# Patient Record
Sex: MALE | Race: WHITE | Employment: OTHER | URBAN - NONMETROPOLITAN AREA
[De-identification: names, ages, dates, MRNs, and addresses within clinical notes are randomized per-mention and may not be internally consistent; named-entity substitution may affect disease eponyms.]

---

## 2020-01-21 ENCOUNTER — ANESTHESIA (OUTPATIENT)
Dept: OPERATING ROOM | Age: 75
End: 2020-01-21
Payer: MEDICARE

## 2020-01-21 ENCOUNTER — ANESTHESIA EVENT (OUTPATIENT)
Dept: OPERATING ROOM | Age: 75
End: 2020-01-21
Payer: MEDICARE

## 2020-01-21 ENCOUNTER — HOSPITAL ENCOUNTER (OUTPATIENT)
Age: 75
Discharge: HOME | End: 2020-01-22
Attending: SURGERY | Admitting: SURGERY
Payer: MEDICARE

## 2020-01-21 ENCOUNTER — APPOINTMENT (OUTPATIENT)
Dept: GENERAL RADIOLOGY | Age: 75
End: 2020-01-21
Payer: MEDICARE

## 2020-01-21 VITALS — SYSTOLIC BLOOD PRESSURE: 136 MMHG | OXYGEN SATURATION: 97 % | DIASTOLIC BLOOD PRESSURE: 71 MMHG

## 2020-01-21 PROBLEM — K22.2 ESOPHAGEAL OBSTRUCTION DUE TO FOOD IMPACTION: Status: ACTIVE | Noted: 2020-01-21

## 2020-01-21 PROBLEM — T18.128A ESOPHAGEAL OBSTRUCTION DUE TO FOOD IMPACTION: Status: ACTIVE | Noted: 2020-01-21

## 2020-01-21 LAB
ANION GAP SERPL CALCULATED.3IONS-SCNC: 11 MMOL/L (ref 9–17)
BUN BLDV-MCNC: 16 MG/DL (ref 8–23)
BUN/CREAT BLD: 14 (ref 9–20)
CALCIUM SERPL-MCNC: 9.4 MG/DL (ref 8.6–10.4)
CHLORIDE BLD-SCNC: 100 MMOL/L (ref 98–107)
CO2: 25 MMOL/L (ref 20–31)
CREAT SERPL-MCNC: 1.17 MG/DL (ref 0.7–1.2)
GFR AFRICAN AMERICAN: >60 ML/MIN
GFR NON-AFRICAN AMERICAN: >60 ML/MIN
GFR SERPL CREATININE-BSD FRML MDRD: ABNORMAL ML/MIN/{1.73_M2}
GFR SERPL CREATININE-BSD FRML MDRD: ABNORMAL ML/MIN/{1.73_M2}
GLUCOSE BLD-MCNC: 127 MG/DL (ref 70–99)
HCT VFR BLD CALC: 32.9 % (ref 40.7–50.3)
HEMOGLOBIN: 10.1 G/DL (ref 13–17)
MCH RBC QN AUTO: 27.4 PG (ref 25.2–33.5)
MCHC RBC AUTO-ENTMCNC: 30.7 G/DL (ref 28.4–34.8)
MCV RBC AUTO: 89.4 FL (ref 82.6–102.9)
NRBC AUTOMATED: 0 PER 100 WBC
PDW BLD-RTO: 13.1 % (ref 11.8–14.4)
PLATELET # BLD: 233 K/UL (ref 138–453)
PMV BLD AUTO: 9.6 FL (ref 8.1–13.5)
POTASSIUM SERPL-SCNC: 4.4 MMOL/L (ref 3.7–5.3)
RBC # BLD: 3.68 M/UL (ref 4.21–5.77)
SODIUM BLD-SCNC: 136 MMOL/L (ref 135–144)
TROPONIN INTERP: NORMAL
TROPONIN T: <0.03 NG/ML
TROPONIN, HIGH SENSITIVITY: NORMAL NG/L (ref 0–22)
WBC # BLD: 9.1 K/UL (ref 3.5–11.3)

## 2020-01-21 PROCEDURE — 3609012400 HC EGD TRANSORAL BIOPSY SINGLE/MULTIPLE: Performed by: SURGERY

## 2020-01-21 PROCEDURE — 2500000003 HC RX 250 WO HCPCS: Performed by: PHYSICIAN ASSISTANT

## 2020-01-21 PROCEDURE — 3700000001 HC ADD 15 MINUTES (ANESTHESIA): Performed by: SURGERY

## 2020-01-21 PROCEDURE — 80048 BASIC METABOLIC PNL TOTAL CA: CPT

## 2020-01-21 PROCEDURE — 6370000000 HC RX 637 (ALT 250 FOR IP): Performed by: SURGERY

## 2020-01-21 PROCEDURE — 2709999900 HC NON-CHARGEABLE SUPPLY: Performed by: SURGERY

## 2020-01-21 PROCEDURE — 99285 EMERGENCY DEPT VISIT HI MDM: CPT

## 2020-01-21 PROCEDURE — 96374 THER/PROPH/DIAG INJ IV PUSH: CPT

## 2020-01-21 PROCEDURE — 84484 ASSAY OF TROPONIN QUANT: CPT

## 2020-01-21 PROCEDURE — 71045 X-RAY EXAM CHEST 1 VIEW: CPT

## 2020-01-21 PROCEDURE — 7100000011 HC PHASE II RECOVERY - ADDTL 15 MIN: Performed by: SURGERY

## 2020-01-21 PROCEDURE — 2580000003 HC RX 258: Performed by: SURGERY

## 2020-01-21 PROCEDURE — 88342 IMHCHEM/IMCYTCHM 1ST ANTB: CPT

## 2020-01-21 PROCEDURE — 88341 IMHCHEM/IMCYTCHM EA ADD ANTB: CPT

## 2020-01-21 PROCEDURE — 99218 PR INITIAL OBSERVATION CARE/DAY 30 MINUTES: CPT | Performed by: SURGERY

## 2020-01-21 PROCEDURE — 2500000003 HC RX 250 WO HCPCS: Performed by: NURSE ANESTHETIST, CERTIFIED REGISTERED

## 2020-01-21 PROCEDURE — 3700000000 HC ANESTHESIA ATTENDED CARE: Performed by: SURGERY

## 2020-01-21 PROCEDURE — 7100000010 HC PHASE II RECOVERY - FIRST 15 MIN: Performed by: SURGERY

## 2020-01-21 PROCEDURE — 93005 ELECTROCARDIOGRAM TRACING: CPT | Performed by: EMERGENCY MEDICINE

## 2020-01-21 PROCEDURE — 6360000002 HC RX W HCPCS: Performed by: NURSE ANESTHETIST, CERTIFIED REGISTERED

## 2020-01-21 PROCEDURE — G0378 HOSPITAL OBSERVATION PER HR: HCPCS

## 2020-01-21 PROCEDURE — 6370000000 HC RX 637 (ALT 250 FOR IP): Performed by: PHYSICIAN ASSISTANT

## 2020-01-21 PROCEDURE — 85027 COMPLETE CBC AUTOMATED: CPT

## 2020-01-21 PROCEDURE — 88305 TISSUE EXAM BY PATHOLOGIST: CPT

## 2020-01-21 PROCEDURE — 6360000002 HC RX W HCPCS: Performed by: SURGERY

## 2020-01-21 RX ORDER — OMEPRAZOLE 20 MG/1
20 CAPSULE, DELAYED RELEASE ORAL DAILY
Status: DISCONTINUED | OUTPATIENT
Start: 2020-01-22 | End: 2020-01-22 | Stop reason: HOSPADM

## 2020-01-21 RX ORDER — SUCRALFATE ORAL 1 G/10ML
1 SUSPENSION ORAL 4 TIMES DAILY
Qty: 420 ML | Refills: 1 | Status: SHIPPED | OUTPATIENT
Start: 2020-01-21 | End: 2020-01-23

## 2020-01-21 RX ORDER — LOSARTAN POTASSIUM 50 MG/1
100 TABLET ORAL DAILY
Status: DISCONTINUED | OUTPATIENT
Start: 2020-01-22 | End: 2020-01-22 | Stop reason: HOSPADM

## 2020-01-21 RX ORDER — TAMSULOSIN HYDROCHLORIDE 0.4 MG/1
0.4 CAPSULE ORAL DAILY
Status: DISCONTINUED | OUTPATIENT
Start: 2020-01-22 | End: 2020-01-22 | Stop reason: HOSPADM

## 2020-01-21 RX ORDER — METFORMIN HYDROCHLORIDE 500 MG/1
500 TABLET, EXTENDED RELEASE ORAL
Status: DISCONTINUED | OUTPATIENT
Start: 2020-01-22 | End: 2020-01-22 | Stop reason: HOSPADM

## 2020-01-21 RX ORDER — GABAPENTIN 300 MG/1
300 CAPSULE ORAL 3 TIMES DAILY
Status: DISCONTINUED | OUTPATIENT
Start: 2020-01-21 | End: 2020-01-22 | Stop reason: HOSPADM

## 2020-01-21 RX ORDER — SODIUM CHLORIDE, SODIUM LACTATE, POTASSIUM CHLORIDE, CALCIUM CHLORIDE 600; 310; 30; 20 MG/100ML; MG/100ML; MG/100ML; MG/100ML
INJECTION, SOLUTION INTRAVENOUS CONTINUOUS
Status: DISCONTINUED | OUTPATIENT
Start: 2020-01-21 | End: 2020-01-22 | Stop reason: HOSPADM

## 2020-01-21 RX ORDER — LIDOCAINE HYDROCHLORIDE 20 MG/ML
INJECTION, SOLUTION EPIDURAL; INFILTRATION; INTRACAUDAL; PERINEURAL PRN
Status: DISCONTINUED | OUTPATIENT
Start: 2020-01-21 | End: 2020-01-21 | Stop reason: SDUPTHER

## 2020-01-21 RX ORDER — METFORMIN HYDROCHLORIDE 500 MG/1
500 TABLET, EXTENDED RELEASE ORAL
COMMUNITY

## 2020-01-21 RX ORDER — ASPIRIN 81 MG/1
81 TABLET ORAL DAILY
Status: DISCONTINUED | OUTPATIENT
Start: 2020-01-22 | End: 2020-01-22 | Stop reason: HOSPADM

## 2020-01-21 RX ORDER — SODIUM CHLORIDE 0.9 % (FLUSH) 0.9 %
10 SYRINGE (ML) INJECTION PRN
Status: DISCONTINUED | OUTPATIENT
Start: 2020-01-21 | End: 2020-01-22 | Stop reason: HOSPADM

## 2020-01-21 RX ORDER — FINASTERIDE 5 MG/1
5 TABLET, FILM COATED ORAL DAILY
Status: DISCONTINUED | OUTPATIENT
Start: 2020-01-22 | End: 2020-01-22 | Stop reason: HOSPADM

## 2020-01-21 RX ORDER — SODIUM CHLORIDE 0.9 % (FLUSH) 0.9 %
10 SYRINGE (ML) INJECTION EVERY 12 HOURS SCHEDULED
Status: DISCONTINUED | OUTPATIENT
Start: 2020-01-21 | End: 2020-01-22 | Stop reason: HOSPADM

## 2020-01-21 RX ORDER — PROPOFOL 10 MG/ML
INJECTION, EMULSION INTRAVENOUS PRN
Status: DISCONTINUED | OUTPATIENT
Start: 2020-01-21 | End: 2020-01-21 | Stop reason: SDUPTHER

## 2020-01-21 RX ORDER — ONDANSETRON 2 MG/ML
4 INJECTION INTRAMUSCULAR; INTRAVENOUS EVERY 6 HOURS PRN
Status: DISCONTINUED | OUTPATIENT
Start: 2020-01-21 | End: 2020-01-22 | Stop reason: HOSPADM

## 2020-01-21 RX ORDER — GABAPENTIN 300 MG/1
300 CAPSULE ORAL 3 TIMES DAILY
COMMUNITY

## 2020-01-21 RX ORDER — CARVEDILOL 12.5 MG/1
12.5 TABLET ORAL 2 TIMES DAILY
Status: DISCONTINUED | OUTPATIENT
Start: 2020-01-21 | End: 2020-01-22 | Stop reason: HOSPADM

## 2020-01-21 RX ORDER — SUCRALFATE 1 G/1
1 TABLET ORAL EVERY 6 HOURS SCHEDULED
Status: DISCONTINUED | OUTPATIENT
Start: 2020-01-22 | End: 2020-01-22 | Stop reason: HOSPADM

## 2020-01-21 RX ADMIN — CARVEDILOL 12.5 MG: 12.5 TABLET, FILM COATED ORAL at 23:48

## 2020-01-21 RX ADMIN — PROPOFOL 20 MG: 10 INJECTION, EMULSION INTRAVENOUS at 22:18

## 2020-01-21 RX ADMIN — PROPOFOL 30 MG: 10 INJECTION, EMULSION INTRAVENOUS at 22:27

## 2020-01-21 RX ADMIN — ONDANSETRON 4 MG: 2 INJECTION INTRAMUSCULAR; INTRAVENOUS at 23:48

## 2020-01-21 RX ADMIN — LIDOCAINE HYDROCHLORIDE 200 MG: 20 INJECTION, SOLUTION EPIDURAL; INFILTRATION; INTRACAUDAL; PERINEURAL at 22:15

## 2020-01-21 RX ADMIN — GABAPENTIN 300 MG: 300 CAPSULE ORAL at 23:48

## 2020-01-21 RX ADMIN — PROPOFOL 20 MG: 10 INJECTION, EMULSION INTRAVENOUS at 22:20

## 2020-01-21 RX ADMIN — NITROGLYCERIN 0.5 INCH: 20 OINTMENT TOPICAL at 19:18

## 2020-01-21 RX ADMIN — SUCRALFATE 1 G: 1 TABLET ORAL at 23:49

## 2020-01-21 RX ADMIN — GLUCAGON HYDROCHLORIDE 1 MG: KIT at 19:17

## 2020-01-21 RX ADMIN — GLUCAGON HYDROCHLORIDE 1 MG: KIT at 22:09

## 2020-01-21 RX ADMIN — PROPOFOL 80 MG: 10 INJECTION, EMULSION INTRAVENOUS at 22:15

## 2020-01-21 RX ADMIN — PROPOFOL 30 MG: 10 INJECTION, EMULSION INTRAVENOUS at 22:23

## 2020-01-21 RX ADMIN — SODIUM CHLORIDE, POTASSIUM CHLORIDE, SODIUM LACTATE AND CALCIUM CHLORIDE: 600; 310; 30; 20 INJECTION, SOLUTION INTRAVENOUS at 23:49

## 2020-01-21 ASSESSMENT — PULMONARY FUNCTION TESTS
PIF_VALUE: 1
PIF_VALUE: 0
PIF_VALUE: 1
PIF_VALUE: 0
PIF_VALUE: 1

## 2020-01-21 ASSESSMENT — ENCOUNTER SYMPTOMS
DYSPNEA ACTIVITY LEVEL: AFTER AMBULATING 2 FLIGHTS OF STAIRS
BLOOD IN STOOL: 0
TROUBLE SWALLOWING: 0
CHEST TIGHTNESS: 0
VOMITING: 0
EYE REDNESS: 0
ABDOMINAL PAIN: 0
CONSTIPATION: 0
WHEEZING: 0
NAUSEA: 0
RHINORRHEA: 0
DIARRHEA: 0
CHOKING: 1
SHORTNESS OF BREATH: 0
BACK PAIN: 0
EYE DISCHARGE: 0
SORE THROAT: 0
COUGH: 0

## 2020-01-21 ASSESSMENT — LIFESTYLE VARIABLES: SMOKING_STATUS: 0

## 2020-01-21 ASSESSMENT — PAIN SCALES - GENERAL: PAINLEVEL_OUTOF10: 0

## 2020-01-22 ENCOUNTER — APPOINTMENT (OUTPATIENT)
Dept: CT IMAGING | Age: 75
End: 2020-01-22
Payer: MEDICARE

## 2020-01-22 VITALS
OXYGEN SATURATION: 99 % | DIASTOLIC BLOOD PRESSURE: 60 MMHG | BODY MASS INDEX: 30.8 KG/M2 | HEART RATE: 76 BPM | TEMPERATURE: 98.4 F | RESPIRATION RATE: 18 BRPM | HEIGHT: 71 IN | WEIGHT: 220 LBS | SYSTOLIC BLOOD PRESSURE: 137 MMHG

## 2020-01-22 PROBLEM — Z98.890 S/P ENDOSCOPY: Status: ACTIVE | Noted: 2020-01-22

## 2020-01-22 LAB
EKG ATRIAL RATE: 64 BPM
EKG P AXIS: 97 DEGREES
EKG P-R INTERVAL: 198 MS
EKG Q-T INTERVAL: 404 MS
EKG QRS DURATION: 100 MS
EKG QTC CALCULATION (BAZETT): 416 MS
EKG R AXIS: 53 DEGREES
EKG T AXIS: 25 DEGREES
EKG VENTRICULAR RATE: 64 BPM

## 2020-01-22 PROCEDURE — 6370000000 HC RX 637 (ALT 250 FOR IP): Performed by: SURGERY

## 2020-01-22 PROCEDURE — 6360000004 HC RX CONTRAST MEDICATION: Performed by: SURGERY

## 2020-01-22 PROCEDURE — 71260 CT THORAX DX C+: CPT

## 2020-01-22 PROCEDURE — G0378 HOSPITAL OBSERVATION PER HR: HCPCS

## 2020-01-22 PROCEDURE — 93010 ELECTROCARDIOGRAM REPORT: CPT | Performed by: INTERNAL MEDICINE

## 2020-01-22 RX ADMIN — GABAPENTIN 300 MG: 300 CAPSULE ORAL at 09:22

## 2020-01-22 RX ADMIN — SUCRALFATE 1 G: 1 TABLET ORAL at 07:07

## 2020-01-22 RX ADMIN — TAMSULOSIN HYDROCHLORIDE 0.4 MG: 0.4 CAPSULE ORAL at 09:22

## 2020-01-22 RX ADMIN — LOSARTAN POTASSIUM 100 MG: 50 TABLET, FILM COATED ORAL at 09:21

## 2020-01-22 RX ADMIN — SUCRALFATE 1 G: 1 TABLET ORAL at 14:00

## 2020-01-22 RX ADMIN — ASPIRIN 81 MG: 81 TABLET, COATED ORAL at 09:22

## 2020-01-22 RX ADMIN — FINASTERIDE 5 MG: 5 TABLET, FILM COATED ORAL at 09:21

## 2020-01-22 RX ADMIN — IOPAMIDOL 75 ML: 755 INJECTION, SOLUTION INTRAVENOUS at 12:34

## 2020-01-22 RX ADMIN — GABAPENTIN 300 MG: 300 CAPSULE ORAL at 14:03

## 2020-01-22 RX ADMIN — CARVEDILOL 12.5 MG: 12.5 TABLET, FILM COATED ORAL at 09:22

## 2020-01-22 ASSESSMENT — PAIN SCALES - GENERAL: PAINLEVEL_OUTOF10: 0

## 2020-01-22 NOTE — PROGRESS NOTES
CT scan results abdomen and pelvis called to Merline Soman. Dr kerns will see patient when he finishes in surgery.

## 2020-01-22 NOTE — ED PROVIDER NOTES
Allergic/Immunologic: Negative for food allergies and immunocompromised state. Neurological: Negative for dizziness, syncope, weakness and light-headedness. Hematological: Negative for adenopathy. Does not bruise/bleed easily. Psychiatric/Behavioral: Negative for behavioral problems and suicidal ideas. The patient is not nervous/anxious. Except as noted above the remainder of the review of systems was reviewed and negative. PAST MEDICAL HISTORY     Past Medical History:   Diagnosis Date    Aortic aneurysm ([address] 2019    CAD (coronary artery disease)     GERD (gastroesophageal reflux disease)     Gout     Hyperlipidemia     Hypertension     Pre-diabetes          SURGICALHISTORY       Past Surgical History:   Procedure Laterality Date   [facility] SURGERY  1982, 1983, 2013    Herniated disc    CORONARY ANGIOPLASTY WITH STENT PLACEMENT  [id number]    x 1    PENILE PROSTHESIS  2003, 2008    PENILE PROSTHESIS  3/16/16    Replacement - Dr. Sania Fox  01/14/2013 Dr Angeles Villasenor    Removal of and Placement Penile Prosthesis    TURP  2003    TURP  12/03/12         CURRENT MEDICATIONS       Previous Medications    ASPIRIN 81 MG TABLET    Take 81 mg by mouth daily    CARVEDILOL (COREG) 12.5 MG TABLET    Take 12.5 mg by mouth 2 times daily Take am of surgery    Take 1 tab in am and 2 tabs at bedtime    CEPHALEXIN (KEFLEX) 500 MG CAPSULE    Take 1 capsule by mouth every 8 hours    FINASTERIDE (PROSCAR) 5 MG TABLET    Take 5 mg by mouth daily. GABAPENTIN (NEURONTIN) 300 MG CAPSULE    Take 300 mg by mouth 3 times daily. LOSARTAN (COZAAR) 100 MG TABLET    Take 100 mg by mouth daily. Take am of sx    METFORMIN (GLUCOPHAGE-XR) 500 MG EXTENDED RELEASE TABLET    Take 500 mg by mouth daily (with breakfast)    METH-HYO-M BL-JUDY ACD-PH SAL (HYOPHEN) 81.6 MG TABS    Take 1 tablet by mouth 3 times daily as needed (dysuria)    MULTIPLE VITAMIN (MULTI-VITAMIN) TABS    Take  by mouth daily. OMEPRAZOLE (PRILOSEC) 20 MG CAPSULE    Take 20 mg by mouth daily. SIMVASTATIN (ZOCOR) 5 MG TABLET    Take 5 mg by mouth nightly. TAMSULOSIN (FLOMAX) 0.4 MG CAPSULE    Take 0.4 mg by mouth daily. VITAMIN D (CHOLECALCIFEROL) 1000 UNITS CAPS CAPSULE    Take 1,000 Units by mouth daily. 2 tabs       ALLERGIES     Patient has no known allergies.     FAMILY HISTORY       Family History   Problem Relation Age of Onset    Arthritis Mother     Cancer Father         liver          SOCIAL HISTORY       Social History     Socioeconomic History    Marital status:      Spouse name: None    Number of children: None    Years of education: None    Highest education level: None   Occupational History    None   Social Needs    Financial resource strain: None    Food insecurity:     Worry: None     Inability: None    Transportation needs:     Medical: None     Non-medical: None   Tobacco Use    Smoking status: Former Smoker     Packs/day: 1.50     Years: 50.00     Pack years: 75.00     Last attempt to quit: 2008     Years since quittin.9    Smokeless tobacco: Never Used   Substance and Sexual Activity    Alcohol use: Yes     Comment: VERY LITTLE    Drug use: No    Sexual activity: None   Lifestyle    Physical activity:     Days per week: None     Minutes per session: None    Stress: None   Relationships    Social connections:     Talks on phone: None     Gets together: None     Attends [employer] service: None     Active member of club or organization: None     Attends meetings of clubs or organizations: None     Relationship status: None    Intimate partner violence:     Fear of current or ex partner: None     Emotionally abused: None     Physically abused: None     Forced sexual activity: None   Other Topics Concern    None   Social History Narrative    None       SCREENINGS      @FLOW([id number])@      PHYSICAL EXAM    (up to 7 for level 4, 8 or more for level 5)     ED Triage Vitals BP Temp Temp Source Pulse Resp SpO2 Height Weight   01/21/20 1814 01/21/20 1813 01/21/20 1813 01/21/20 1813 01/21/20 1813 01/21/20 1813 01/21/20 1813 01/21/20 1813   (!) 149/60 97.3 °F (36.3 °C) Oral 70 18 95 % 5' 11\" (1.803 m) 220 lb (99.8 kg)       Physical Exam  Vitals signs and nursing note reviewed. Constitutional:       General: He is not in acute distress. Appearance: He is well-developed. He is not diaphoretic. HENT:      Head: Normocephalic and atraumatic. Right Ear: External ear normal.      Left Ear: External ear normal.      Nose: Nose normal.      Mouth/Throat:      Mouth: Mucous membranes are moist.      Pharynx: No oropharyngeal exudate. Comments: Uvula is midline. No oral pharyngeal swelling or edema  Eyes:      General: No scleral icterus. Right eye: No discharge. Left eye: No discharge. Conjunctiva/sclera: Conjunctivae normal.      Pupils: Pupils are equal, round, and reactive to light. Neck:      Musculoskeletal: Normal range of motion and neck supple. Thyroid: No thyromegaly. Trachea: No tracheal deviation. Cardiovascular:      Rate and Rhythm: Normal rate and regular rhythm. Heart sounds: No murmur. No friction rub. No gallop. Pulmonary:      Effort: Pulmonary effort is normal. No respiratory distress. Breath sounds: Normal breath sounds. No stridor. No wheezing or rhonchi. Abdominal:      General: Bowel sounds are normal. There is no distension. Palpations: Abdomen is soft. Tenderness: There is no guarding or rebound. Musculoskeletal: Normal range of motion. General: No tenderness or deformity. Lymphadenopathy:      Cervical: No cervical adenopathy. Skin:     General: Skin is warm and dry. Findings: No erythema or rash. Neurological:      Mental Status: He is alert and oriented to person, place, and time. Cranial Nerves: No cranial nerve deficit. Motor: No abnormal muscle tone. Deep Tendon Reflexes: Reflexes are normal and symmetric. Reflexes normal.   Psychiatric:         Behavior: Behavior normal.         DIAGNOSTIC RESULTS     EKG: All EKG's are interpreted by the Emergency Department Physician who either signs orCo-signs this chart in the absence of a cardiologist.      RADIOLOGY:   Non-plainfilm images such as CT, Ultrasound and MRI are read by the radiologist. Plain radiographic images are visualized and preliminarily interpreted by the emergency physician with the below findings:      Interpretationper the Radiologist below, if available at the time of this note:    XR CHEST PORTABLE   Final Result   Left lower lobe subsegmental atelectasis               ED BEDSIDE ULTRASOUND:   Performed by ED Physician - none    LABS:  Labs Reviewed   CBC - Abnormal; Notable for the following components:       Result Value    RBC 3.68 (*)     Hemoglobin 10.1 (*)     Hematocrit 32.9 (*)     All other components within normal limits   BASIC METABOLIC PANEL - Abnormal; Notable for the following components:    Glucose 127 (*)     All other components within normal limits   TROPONIN       All other labs were within normal range or not returned as of this dictation. EMERGENCY DEPARTMENT COURSE and DIFFERENTIAL DIAGNOSIS/MDM:   Vitals:    Vitals:    01/21/20 1813 01/21/20 1814   BP:  (!) 149/60   Pulse: 70    Resp: 18    Temp: 97.3 °F (36.3 °C)    TempSrc: Oral    SpO2: 95%    Weight: 220 lb (99.8 kg)    Height: 5' 11\" (1.803 m)          MDM  69-year-old male who presents because he has been unable to swallow anything all day. Reports that he last ate chicken last night and felt something in his throat. On exam he is slightly spitting things up. I am going to try pharmacological approach to alleviating the food bolus. However I discussed them that this might be a type of endoscopic procedure.     Patient denies any history of endoscopy and before in the past.  After pharmacologic attempt he is

## 2020-01-22 NOTE — ED NOTES
Patient given water, patient states when he drinks it still feels like something is stuck.      Jag Fischer RN  01/21/20 2046

## 2020-01-22 NOTE — H&P
Brock Soto is an 75 y. o.male. Allergies: No Known Allergies    Active Problems:    * No active hospital problems. *  Resolved Problems:    * No resolved hospital problems. *    Blood pressure (!) 145/81, pulse 71, temperature 96.8 °F (36 °C), temperature source Temporal, resp. rate 16, height 5' 11\" (1.803 m), weight 220 lb (99.8 kg), SpO2 100 %. HPI    Mr Kayy Germain is a pleasant 76 yo WM who had difficulty swallowing last night while eating chicken soup. It did not improve today. He presented through [place] [facility], unable to swallow liquids. Not improved with Glucagon. No coughing nor other signs of aspiration. Long history of GERD symptoms, well managed with PPI over the last several years. No recent weight changes. No abdominal pain. Colonoscopy about 8 years ago was normal without need for biopsy, per patient. No prior EGD. Quit tobacco several years ago after smoking 1-2 ppd. No excessive use of EtOH. No history of caustic ingestion. He lives alone, retired. Review of Systems   Constitutional: Negative for activity change, appetite change, chills, fever and unexpected weight change. HENT: Negative for nosebleeds, sneezing, sore throat and trouble swallowing. Eyes: Negative for visual disturbance. Respiratory: Positive for choking. Negative for cough and shortness of breath. Cardiovascular: Negative for chest pain, palpitations and leg swelling. Gastrointestinal: Negative for abdominal pain, blood in stool, nausea and vomiting. Genitourinary: Negative for dysuria, flank pain and hematuria. Musculoskeletal: Positive for arthralgias, neck pain and neck stiffness. Negative for back pain, gait problem and myalgias. Allergic/Immunologic: Negative for immunocompromised state. Neurological: Negative for dizziness, seizures, syncope, weakness and headaches. Hematological: Does not bruise/bleed easily. Psychiatric/Behavioral: Negative for confusion and sleep disturbance. Past Medical History:   Diagnosis Date    Aortic aneurysm ([facility] [address] 2019    CAD (coronary artery disease)     GERD (gastroesophageal reflux disease)     Gout     Hyperlipidemia     Hypertension     Pre-diabetes        Past Surgical History:   Procedure Laterality Date   [facility] SURGERY  , ,     Herniated disc    CORONARY ANGIOPLASTY WITH STENT PLACEMENT  9-2013    x 1    PENILE PROSTHESIS  ,     PENILE PROSTHESIS  3/16/16    Replacement - Dr. Rico Rizvi  2013 Dr Ever Raygoza    Removal of and Placement Penile Prosthesis    TURP      TURP  12       Family History   Problem Relation Age of Onset    Arthritis Mother     Cancer Father         liver       Allergies:  See list    Current Facility-Administered Medications   Medication Dose Route Frequency Provider Last Rate Last Dose    glucagon (rDNA) 1 MG injection                Social History     Socioeconomic History    Marital status:      Spouse name: None    Number of children: None    Years of education: None    Highest education level: None   Occupational History    None   Social Needs    Financial resource strain: None    Food insecurity:     Worry: None     Inability: None    Transportation needs:     Medical: None     Non-medical: None   Tobacco Use    Smoking status: Former Smoker     Packs/day: 1.50     Years: 50.00     Pack years: 75.00     Last attempt to quit: 2008     Years since quittin.9    Smokeless tobacco: Never Used   Substance and Sexual Activity    Alcohol use: Yes     Comment: VERY LITTLE    Drug use: No    Sexual activity: None   Lifestyle    Physical activity:     Days per week: None     Minutes per session: None    Stress: None   Relationships    Social connections:     Talks on phone: None     Gets together: None     Attends [employer] service: None     Active member of club or organization: None     Attends meetings of clubs or organizations: None     Relationship status: None    Intimate partner violence:     Fear of current or ex partner: None     Emotionally abused: None     Physically abused: None     Forced sexual activity: None   Other Topics Concern    None   Social History Narrative    None       Physical Exam  Vitals signs and nursing note reviewed. Constitutional:       General: He is not in acute distress. Appearance: He is well-developed. HENT:      Head: Normocephalic and atraumatic. Eyes:      General: No scleral icterus. Conjunctiva/sclera: Conjunctivae normal.      Pupils: Pupils are equal, round, and reactive to light. Neck:      Musculoskeletal: Normal range of motion and neck supple. Vascular: No JVD. Trachea: No tracheal deviation. Cardiovascular:      Rate and Rhythm: Normal rate and regular rhythm. Pulmonary:      Effort: Pulmonary effort is normal. No respiratory distress. Breath sounds: Normal breath sounds. Chest:      Chest wall: No tenderness. Abdominal:      General: Bowel sounds are normal. There is no distension. Palpations: Abdomen is soft. There is no mass. Tenderness: There is no tenderness. There is no guarding or rebound. Musculoskeletal: Normal range of motion. Lymphadenopathy:      Cervical: No cervical adenopathy. Skin:     General: Skin is warm and dry. Findings: No erythema or rash. Neurological:      Mental Status: He is alert and oriented to person, place, and time. Cranial Nerves: No cranial nerve deficit. Psychiatric:         Behavior: Behavior normal.         Thought Content:  Thought content normal.         Judgment: Judgment normal.          EGD [[id number]]    Resulted: 01/21/20 2133    Updated: 01/21/20 2133    Narrative:     No dictation    XR CHEST PORTABLE [[id number]]    Collected: 01/21/20 1919    Updated: 01/21/20 1933    Narrative:     EXAMINATION:  ONE XRAY VIEW OF THE CHEST    1/21/2020 7:17 pm    COMPARISON:  None. HISTORY:  ORDERING SYSTEM PROVIDED HISTORY: unable to swallow  TECHNOLOGIST PROVIDED HISTORY:  unable to swallow    FINDINGS:  Left lower lobe subsegmental atelectasis.  Right lung clear.  Heart and  mediastinum normal.  Bony thorax intact. Impression:     Left lower lobe subsegmental atelectasis   Troponin [[id number]]    Collected: 01/21/20 1845    Updated: 01/21/20 1927    Specimen Source: Blood     Troponin, High Sensitivity NOT REPORTED ng/L    Troponin T <0.03 ng/mL    Comment: Troponin T results cannot be compared to Troponin-I results. Troponin Interp         Comment: Reference Range:    <0.03     Within reference range.    0.03-0.09 Possible myocardial damage. Repeat at appropriate intervals to rule out chronic elevation.    >= 0.10   Indicative of myocardial damage.         Patients with high levels of Biotin oral intake (i.e >5mg/day) may have falsely decreased   Troponin T levels. Samples collected within 8 hours of biotin intake may require additional   information for diagnosis. Basic Metabolic Panel [[id number]] (Abnormal)    Collected: 01/21/20 1845    Updated: 01/21/20 1925    Specimen Source: Blood     Glucose 127High  mg/dL    BUN 16 mg/dL    CREATININE 1.17 mg/dL    Bun/Cre Ratio 14    Calcium 9.4 mg/dL    Sodium 136 mmol/L    Potassium 4.4 mmol/L    Chloride 100 mmol/L    CO2 25 mmol/L    Anion Gap 11 mmol/L    GFR Non-African American >60 mL/min    GFR African American >60 mL/min    GFR Comment         Comment: Average GFR for 79or more years old:    65 mL/min/1.73sq m   Chronic Kidney Disease:    <60 mL/min/1.73sq m   Kidney failure:    <15 mL/min/1.73sq m               eGFR calculated using average adult body mass.  Additional eGFR calculator available at:         [email or web address]              GFR Staging         Comment: Stage 1: Some kidney damage normal GFR   Stage 2: Mild kidney damage GFR 60-89   Stage 3: Moderate

## 2020-01-22 NOTE — BRIEF OP NOTE
Brief Postoperative Note  ______________________________________________________________    Patient: Be Corrales  YOB: 1945  MRN: [id number]  Date of Procedure: 1/21/2020    Pre-Op Diagnosis:      1. Dysphagia with esophageal food bolus      2. History GERD    Post-Op Diagnosis:      1. Esophageal food bolus     2. Distal esophageal mass     3. Small hiatal hernia       Procedure(s):      1. EGD     2. Clearance of esophageal food bolus     3. Biopsies GE junction mass    Anesthesia: General    Surgeon(s):  Guzman Steward MD    Assistant:      Estimated Blood Loss (mL): less than 82MF     Complications: None    Specimens:   ID Type Source Tests Collected by Time Destination   A :  Tissue GE Junction Biopsy SURGICAL PATHOLOGY Guzman Steward MD 1/21/2020 2229      Findings:   As above.     Dictated # [id number]    Guzman Steward MD  Date: 1/21/2020  Time: 10:34 PM

## 2020-01-22 NOTE — OP NOTE
[address], [place] [address]                                OPERATIVE REPORT    PATIENT NAME: Colton Petty                     :        1945  MED REC NO:   [id number]                              ROOM:       0315  ACCOUNT NO:   [de-identified]                           ADMIT DATE: 2020  PROVIDER:     Jacey Zheng    DATE OF PROCEDURE:  2020    ATTENDING SURGEON:  Dr. Jacey Zhneg. PRIMARY CARE PROVIDER:  Dr. Poonam Ballard. PREOPERATIVE DIAGNOSES:  1. Dysphagia with esophageal food bolus. 2.  History of gastroesophageal reflux disease. POSTOPERATIVE DIAGNOSES:  1. Esophageal food bolus. 2.  Distal esophageal mass. 3.  Small hiatal hernia. PROCEDURES PERFORMED:  1. Esophagogastroduodenoscopy. 2.  Clearance of esophageal food bolus. 3.  Biopsies of GE junction mass. ANESTHESIA:  General.    ESTIMATED BLOOD LOSS:  Less than 20 mL. INDICATIONS:  The patient is a pleasant 26-year-old white male with a  long history of gastroesophageal reflux disease well managed with  omeprazole, significant tobacco history with one to two packs per day  for most of his life, quit tobacco in , 75 pack years. He felt a  piece of food become lodged in his throat 24 hours ago when eating  dinner. Failed to clear with glucagon. At this time, EGD is indicated. DESCRIPTION OF PROCEDURE:  After obtaining informed consent with  discussion of risks, benefits and alternatives including a remote risk  of GI bleeding, perforation, missed lesions, aspiration, recurrence,  etc., the patient was taken to the operating theater and placed in the  left lateral recumbent position. Following adequate IV sedation, an  endoscope was passed over the tongue into the posterior pharynx. Vocal  folds were visualized and appeared to be normal.  The scope was directed  into the esophagus down to the GE junction.   Upper and mid esophagus  appeared normal.  In the lower esophagus at the GE junction, several  pieces of food were present. These were easily passed into the stomach  without difficulty. Small hiatal hernia was present. There was severe  esophagitis with the appearance of a distal esophageal mass at the level  of the GE junction. Biopsies of the mass were obtained. The stomach  was entered, had normal distensibility. On retroflexion, there did not  appear to be a gastric component to the mass. The fundus and cardia  appeared normal.  The body and prepyloric antrum had minimal gastritis  without ulceration. Pylorus was patent. The duodenum was entered. First, second, and third portions appeared normal.  The stomach was  decompressed by suction as the scope was removed. The patient tolerated  the procedure well and was transferred to PACU in stable condition. SPECIMENS:  Biopsies of GE junction mass. DRAINS:  None. COMPLICATIONS:  None. DISPOSITION:  To PACU awake, alert and stable. Following recovery, we  will admit the patient for extended recovery on the Surgical Floor. We  will request a CT scan of the chest, abdomen, and pelvis in the morning  with discharge thereafter on a liquid diet. Followup will be with me in  one week to review pathology.         Randell Walker    D: 01/21/2020 22:41:20       T: 01/21/2020 22:46:14     [email or web address]  Job#: [id number]     Doc#: [id number]    CC:  Vishal Pierce

## 2020-01-22 NOTE — PROGRESS NOTES
Social Work intial Assessment/Discharge Plan      Diagnosis:  Esophageal obstruction due to food impaction    Met with:  Patient      PCP:  Poonam Ballard MD    Payment Source:  [facility]    Advance Directives:  None    Code Status:  Full    Mental Status:  Alert and oriented. Very pleasant. Living Arrangement:  Home     Support Systems:  Marcial Webster, brother    Patient able to perform ADL's:  Independant    Current Services:  None    Current DME Equipment:  None    Able to get medication fill & pharmacy:   Patient is able to obtain and pay for his own medications         Transportation provider:  Patient drives    Collaborative List of SNF/HH were provided:  N/A    Any concerns or Barriers to discharge:  None        Anticipated Needs/Discharge Plan:  Patient will be able to drive home where he lives by himself in [facility]. He is  but has a supportive brother, Glendy Mckenzie who lives nearby.

## 2020-01-22 NOTE — PROGRESS NOTES
Nutrition Assessment    Type and Reason for Visit: Initial, Positive Nutrition Screen(difficulty swallowing)    Nutrition Recommendations:   1. Full liquid diet as ordered  2. Await results of CT scan  3. Suggest slow, thorough mastication of food in oral cavity before swallowing  4. Alternating small sips of water. 5. Suggest trial of blended smoothie at home and check tolerance  6. May need digestive enzymes for further breakdown of food? Nutrition Assessment: Inadequate oral intake related to difficulty swallowing as evidenced by reported mass in throat. Pt states recently he has been consuming softer foods, such as cream of wheat and able to tolerate. Pt has GERD and is well controlled. Pt feels as though there is a mass in esophagus and feels like throat is closing when trying to swallow. Pt states he did not have any dietary restrictions when at home, and may not have been thoroughly masticating food. Pt's weight has been stable at 220# as reported, and per EHR, wt has decreased by 16# (6.8%) over past 4 years, which does not meet criteria for significant. Labs reviewed and serum glucose is acutely elevated. Pt is at moderate nutrition risk and does not meet the criteria for ASPEN/AND guidelines for malnutrition. Malnutrition Assessment:  · Malnutrition Status: No malnutrition  · Context: Acute illness or injury  · Findings of the 6 clinical characteristics of malnutrition (Minimum of 2 out of 6 clinical characteristics is required to make the diagnosis of moderate or severe Protein Calorie Malnutrition based on AND/ASPEN Guidelines):  1. Energy Intake-Unable to assess, Unable to assess    2. Weight Loss-No significant weight loss, unable to assess  3. Fat Loss-No significant subcutaneous fat loss,    4. Muscle Loss-No significant muscle mass loss,    5. Fluid Accumulation-No significant fluid accumulation,    6.   Strength-Not measured    Recent Labs     01/21/20  1845      K 4.4    CO2 25   BUN 16   CREATININE 1.17   GLUCOSE 127*   GFR               No results found for: LABALBU     Nutrition Risk Level:  Moderate    Nutrient Needs:  · Estimated Daily Total Kcal: 9401-1624  · Estimated Daily Protein (g): 78-94(1.0-1.2)  · Estimated Daily Total Fluid (ml/day): 1800 ml+    Nutrition Diagnosis:   · Problem: Inadequate oral intake  · Etiology: related to Difficulty swallowing     Signs and symptoms:  as evidenced by (reported mass in throat, feeling of esophagus closing.)    Objective Information:  · Nutrition-Focused Physical Findings: rounded emesis, clear foamy emesis  · Wound Type: None  · Current Nutrition Therapies:  · Oral Diet Orders: Full Liquid   · Oral Diet intake: %  · Oral Nutrition Supplement (ONS) Orders: None  · ONS intake: 0%  · Anthropometric Measures:  · Ht: 5' 11\" (180.3 cm)   · Current Body Wt: 220 lb (99.8 kg)  · Admission Body Wt: 220 lb (99.8 kg)  · Usual Body Wt: 220 lb (99.8 kg)  · % Weight Change:  ,  16# (6.8%) x ~4 years  · Ideal Body Wt: 172 lb (78 kg), % Ideal Body 128%  · Adjusted Body Wt:  , body weight adjusted for    · BMI Classification: BMI 30.0 - 34.9 Obese Class I    Nutrition Interventions:   Continue current diet  Continued Inpatient Monitoring, Education Completed, Coordination of Care    Nutrition Evaluation:   · Evaluation: Goals set   · Goals: PO intake > 75% without difficulty swallowing    · Monitoring: Meal Intake, Diet Tolerance, Weight, Pertinent Labs, Chewing/Swallowing, Constipation, Patient/Family Education      Electronically signed by Sushila Vanessa RD, LD on 1/22/20 at 11:58 AM    Contact Number: [phone]

## 2020-01-22 NOTE — PLAN OF CARE
Problem: Pain:  Goal: Pain level will decrease  Description  Pain level will decrease  Outcome: Met This Shift     Problem: Activity Intolerance:  Goal: Ability to tolerate increased activity will improve  Description  Ability to tolerate increased activity will improve  Outcome: Met This Shift     Problem:  Bowel Function - Altered:  Goal: Bowel elimination is within specified parameters  Description  Bowel elimination is within specified parameters  Outcome: Met This Shift

## 2020-01-22 NOTE — CARE COORDINATION
Explained patients right to have family, representative or physician notified of their admission. Patient has Requested for physician to be notified. Patient has Declined for family/representative to be notified. Notified Pt PCP, Dr Tish Oseguera, in [facility] office of Pt admission and follow up appt made at this time. Follow up appt made with surgeon, Dr Mikey Harris, at this time also.    ZHEN Godinez RN

## 2020-01-22 NOTE — PROGRESS NOTES
Pt transported to room via stretcher, ambulated from stretcher to bed with no difficulty, no s/s of distress or complications, vitals and assessment preformed, oriented to room, call light and bedside table within reach

## 2020-01-22 NOTE — PROGRESS NOTES
Pt remains resting with eyes closed, resp.  Unlabored t/o shift, no s/s of complications or distress

## 2020-01-22 NOTE — ANESTHESIA PRE PROCEDURE
Department of Anesthesiology  Preprocedure Note       Name:  Licha Cramer   Age:  75 y.o.  :  1945                                          MRN:  [id number]         Date:  2020      Surgeon: Fredis Davis):  Anthony Martins MD    Procedure: EGD ESOPHAGOGASTRODUODENOSCOPY (N/A )    Medications prior to admission:   Prior to Admission medications    Medication Sig Start Date End Date Taking? Authorizing Provider   aspirin 81 MG tablet Take 81 mg by mouth daily   Yes Historical Provider, MD   metFORMIN (GLUCOPHAGE-XR) 500 MG extended release tablet Take 500 mg by mouth daily (with breakfast)   Yes Historical Provider, MD   gabapentin (NEURONTIN) 300 MG capsule Take 300 mg by mouth 3 times daily. Yes Historical Provider, MD   tamsulosin (FLOMAX) 0.4 MG capsule Take 0.4 mg by mouth daily. Yes Historical Provider, MD   Multiple Vitamin (MULTI-VITAMIN) TABS Take  by mouth daily. Yes Historical Provider, MD   Vitamin D (CHOLECALCIFEROL) 1000 UNITS CAPS capsule Take 1,000 Units by mouth daily. 2 tabs   Yes Historical Provider, MD   omeprazole (PRILOSEC) 20 MG capsule Take 20 mg by mouth daily. Yes Historical Provider, MD   simvastatin (ZOCOR) 5 MG tablet Take 5 mg by mouth nightly. Yes Historical Provider, MD   finasteride (PROSCAR) 5 MG tablet Take 5 mg by mouth daily. Yes Historical Provider, MD   losartan (COZAAR) 100 MG tablet Take 100 mg by mouth daily.  Take am of sx   Yes Historical Provider, MD   carvedilol (COREG) 12.5 MG tablet Take 12.5 mg by mouth 2 times daily Take am of surgery    Take 1 tab in am and 2 tabs at bedtime   Yes Historical Provider, MD   cephALEXin (KEFLEX) 500 MG capsule Take 1 capsule by mouth every 8 hours 3/17/16   PADILLA Paul CNP   meth-hyo-m bl-matt acd-ph sal (HYOPHEN) 81.6 MG TABS Take 1 tablet by mouth 3 times daily as needed (dysuria) 3/17/16   PADILLA Paul CNP       Current medications:    No current facility-administered medications Time of last solid consumption: 2000                        Date of last liquid consumption: 01/20/20                        Date of last solid food consumption: 01/20/20    BMI:   Wt Readings from Last 3 Encounters:   01/21/20 220 lb (99.8 kg)   03/28/16 236 lb (107 kg)   03/15/16 235 lb (106.6 kg)     Body mass index is 30.68 kg/m². CBC:   Lab Results   Component Value Date    WBC 9.1 01/21/2020    RBC 3.68 01/21/2020    HGB 10.1 01/21/2020    HCT 32.9 01/21/2020    MCV 89.4 01/21/2020    RDW 13.1 01/21/2020     01/21/2020       CMP:   Lab Results   Component Value Date     01/21/2020    K 4.4 01/21/2020     01/21/2020    CO2 25 01/21/2020    BUN 16 01/21/2020    CREATININE 1.17 01/21/2020    GFRAA >60 01/21/2020    LABGLOM >60 01/21/2020    LABGLOM 66 03/17/2016    GLUCOSE 127 01/21/2020    GLUCOSE 145 03/07/2016    CALCIUM 9.4 01/21/2020       POC Tests: No results for input(s): POCGLU, POCNA, POCK, POCCL, POCBUN, POCHEMO, POCHCT in the last 72 hours. Coags: No results found for: PROTIME, INR, APTT    HCG (If Applicable): No results found for: PREGTESTUR, PREGSERUM, HCG, HCGQUANT     ABGs: No results found for: PHART, PO2ART, MIA3VYR, RKR9OFJ, BEART, K9ZGETRJ     Type & Screen (If Applicable):  No results found for: LABABO, LABRH    Anesthesia Evaluation   no history of anesthetic complications:   Airway: Mallampati: II  TM distance: >3 FB   Neck ROM: full  Mouth opening: > = 3 FB Dental: normal exam         Pulmonary: breath sounds clear to auscultation      (-) recent URI and not a current smoker                          ROS comment: 1/21/2020 CXR: FINDINGS:  Left lower lobe subsegmental atelectasis.  Right lung clear.  Heart and  mediastinum normal.  Bony thorax intact.        Cardiovascular:  Exercise tolerance: good (>4 METS),   (+) hypertension:, CAD:, ALICIA: after ambulating 2 flights of stairs,                   Neuro/Psych:               GI/Hepatic/Renal:   (+) GERD: well

## 2020-01-23 ENCOUNTER — TELEPHONE (OUTPATIENT)
Dept: SURGERY | Age: 75
End: 2020-01-23

## 2020-01-23 RX ORDER — SUCRALFATE 1 G/1
1 TABLET ORAL 4 TIMES DAILY
Qty: 120 TABLET | Refills: 3 | Status: SHIPPED | OUTPATIENT
Start: 2020-01-23

## 2020-01-23 NOTE — TELEPHONE ENCOUNTER
Dr Oma Meredith ordered Glucerna nutritional supplements 1-2 daily for patient. [facility] pharmacy unable to fill. Order sent to [facility] in [facility]. Left message at pharmacy to discuss. Patient update.

## 2020-01-24 LAB — SURGICAL PATHOLOGY REPORT: NORMAL

## 2020-01-28 ENCOUNTER — TELEPHONE (OUTPATIENT)
Dept: ONCOLOGY | Age: 75
End: 2020-01-28

## 2020-01-28 NOTE — TELEPHONE ENCOUNTER
Call patient for appt reminder for 1/29/20. Patient not feeling up to driving to his appointment. Wants to cancell and will call office to rs later time.

## (undated) DEVICE — FORCEPS BX L240CM JAW DIA2.4MM ORNG L CAP W/ NDL DISP RAD

## (undated) DEVICE — MEDI-VAC NON-CONDUCTIVE TUBING7MM X 30.5 (100FT): Brand: CARDINAL HEALTH

## (undated) DEVICE — SOLUTION IV IRRIG POUR BRL 0.9% SODIUM CHL 2F7124

## (undated) DEVICE — CANNULA ORAL NSL AD CO2 N INTUB O2 DEL DISP TRU LNK